# Patient Record
Sex: FEMALE | Race: OTHER | NOT HISPANIC OR LATINO | Employment: FULL TIME | URBAN - METROPOLITAN AREA
[De-identification: names, ages, dates, MRNs, and addresses within clinical notes are randomized per-mention and may not be internally consistent; named-entity substitution may affect disease eponyms.]

---

## 2022-03-28 ENCOUNTER — HOSPITAL ENCOUNTER (EMERGENCY)
Facility: HOSPITAL | Age: 47
Discharge: HOME | End: 2022-03-28
Attending: EMERGENCY MEDICINE | Admitting: EMERGENCY MEDICINE
Payer: COMMERCIAL

## 2022-03-28 ENCOUNTER — APPOINTMENT (EMERGENCY)
Dept: RADIOLOGY | Facility: HOSPITAL | Age: 47
End: 2022-03-28
Payer: COMMERCIAL

## 2022-03-28 VITALS
HEIGHT: 62 IN | HEART RATE: 77 BPM | DIASTOLIC BLOOD PRESSURE: 68 MMHG | RESPIRATION RATE: 16 BRPM | OXYGEN SATURATION: 98 % | WEIGHT: 120 LBS | TEMPERATURE: 98 F | SYSTOLIC BLOOD PRESSURE: 142 MMHG | BODY MASS INDEX: 22.08 KG/M2

## 2022-03-28 DIAGNOSIS — R07.9 CHEST PAIN: ICD-10-CM

## 2022-03-28 DIAGNOSIS — I47.1 SVT (SUPRAVENTRICULAR TACHYCARDIA) (HCC): Primary | ICD-10-CM

## 2022-03-28 LAB
2HR DELTA HS TROPONIN: 268 NG/L
ALBUMIN SERPL BCP-MCNC: 3.8 G/DL (ref 3.5–5)
ALP SERPL-CCNC: 56 U/L (ref 46–116)
ALT SERPL W P-5'-P-CCNC: 32 U/L (ref 12–78)
ANION GAP SERPL CALCULATED.3IONS-SCNC: 9 MMOL/L (ref 4–13)
APTT PPP: 29 SECONDS (ref 23–37)
AST SERPL W P-5'-P-CCNC: 29 U/L (ref 5–45)
ATRIAL RATE: 108 BPM
BACTERIA UR QL AUTO: NORMAL /HPF
BASOPHILS # BLD AUTO: 0.02 THOUSANDS/ΜL (ref 0–0.1)
BASOPHILS NFR BLD AUTO: 0 % (ref 0–1)
BILIRUB SERPL-MCNC: 0.53 MG/DL (ref 0.2–1)
BILIRUB UR QL STRIP: NEGATIVE
BUN SERPL-MCNC: 14 MG/DL (ref 5–25)
CALCIUM SERPL-MCNC: 8.1 MG/DL (ref 8.3–10.1)
CARDIAC TROPONIN I PNL SERPL HS: 335 NG/L
CARDIAC TROPONIN I PNL SERPL HS: 67 NG/L
CHLORIDE SERPL-SCNC: 103 MMOL/L (ref 100–108)
CLARITY UR: CLEAR
CO2 SERPL-SCNC: 25 MMOL/L (ref 21–32)
COLOR UR: ABNORMAL
CREAT SERPL-MCNC: 0.81 MG/DL (ref 0.6–1.3)
D DIMER PPP FEU-MCNC: 0.3 UG/ML FEU
EOSINOPHIL # BLD AUTO: 0.06 THOUSAND/ΜL (ref 0–0.61)
EOSINOPHIL NFR BLD AUTO: 1 % (ref 0–6)
ERYTHROCYTE [DISTWIDTH] IN BLOOD BY AUTOMATED COUNT: 12.7 % (ref 11.6–15.1)
GFR SERPL CREATININE-BSD FRML MDRD: 86 ML/MIN/1.73SQ M
GLUCOSE SERPL-MCNC: 154 MG/DL (ref 65–140)
GLUCOSE UR STRIP-MCNC: NEGATIVE MG/DL
HCT VFR BLD AUTO: 43.2 % (ref 34.8–46.1)
HGB BLD-MCNC: 14.2 G/DL (ref 11.5–15.4)
HGB UR QL STRIP.AUTO: ABNORMAL
IMM GRANULOCYTES # BLD AUTO: 0.03 THOUSAND/UL (ref 0–0.2)
IMM GRANULOCYTES NFR BLD AUTO: 0 % (ref 0–2)
INR PPP: 1.04 (ref 0.84–1.19)
KETONES UR STRIP-MCNC: NEGATIVE MG/DL
LEUKOCYTE ESTERASE UR QL STRIP: NEGATIVE
LYMPHOCYTES # BLD AUTO: 0.93 THOUSANDS/ΜL (ref 0.6–4.47)
LYMPHOCYTES NFR BLD AUTO: 10 % (ref 14–44)
MAGNESIUM SERPL-MCNC: 1.8 MG/DL (ref 1.6–2.6)
MCH RBC QN AUTO: 30.5 PG (ref 26.8–34.3)
MCHC RBC AUTO-ENTMCNC: 32.9 G/DL (ref 31.4–37.4)
MCV RBC AUTO: 93 FL (ref 82–98)
MONOCYTES # BLD AUTO: 0.44 THOUSAND/ΜL (ref 0.17–1.22)
MONOCYTES NFR BLD AUTO: 5 % (ref 4–12)
NEUTROPHILS # BLD AUTO: 7.81 THOUSANDS/ΜL (ref 1.85–7.62)
NEUTS SEG NFR BLD AUTO: 84 % (ref 43–75)
NITRITE UR QL STRIP: NEGATIVE
NON-SQ EPI CELLS URNS QL MICRO: NORMAL /HPF
NRBC BLD AUTO-RTO: 0 /100 WBCS
P AXIS: 149 DEGREES
PH UR STRIP.AUTO: 5.5 [PH]
PLATELET # BLD AUTO: 182 THOUSANDS/UL (ref 149–390)
PMV BLD AUTO: 10.4 FL (ref 8.9–12.7)
POTASSIUM SERPL-SCNC: 3.8 MMOL/L (ref 3.5–5.3)
PR INTERVAL: 142 MS
PROT SERPL-MCNC: 7.3 G/DL (ref 6.4–8.2)
PROT UR STRIP-MCNC: NEGATIVE MG/DL
PROTHROMBIN TIME: 13.4 SECONDS (ref 11.6–14.5)
QRS AXIS: 129 DEGREES
QRS AXIS: 60 DEGREES
QRSD INTERVAL: 76 MS
QRSD INTERVAL: 94 MS
QT INTERVAL: 268 MS
QT INTERVAL: 300 MS
QTC INTERVAL: 402 MS
QTC INTERVAL: 426 MS
RBC # BLD AUTO: 4.66 MILLION/UL (ref 3.81–5.12)
RBC #/AREA URNS AUTO: NORMAL /HPF
SODIUM SERPL-SCNC: 137 MMOL/L (ref 136–145)
SP GR UR STRIP.AUTO: 1.01 (ref 1–1.03)
T WAVE AXIS: 134 DEGREES
T WAVE AXIS: 43 DEGREES
TSH SERPL DL<=0.05 MIU/L-ACNC: 1.38 UIU/ML (ref 0.36–3.74)
UROBILINOGEN UR QL STRIP.AUTO: 0.2 E.U./DL
VENTRICULAR RATE: 108 BPM
VENTRICULAR RATE: 152 BPM
WBC # BLD AUTO: 9.29 THOUSAND/UL (ref 4.31–10.16)
WBC #/AREA URNS AUTO: NORMAL /HPF

## 2022-03-28 PROCEDURE — 93005 ELECTROCARDIOGRAM TRACING: CPT

## 2022-03-28 PROCEDURE — 99245 OFF/OP CONSLTJ NEW/EST HI 55: CPT | Performed by: INTERNAL MEDICINE

## 2022-03-28 PROCEDURE — 85730 THROMBOPLASTIN TIME PARTIAL: CPT | Performed by: EMERGENCY MEDICINE

## 2022-03-28 PROCEDURE — 36415 COLL VENOUS BLD VENIPUNCTURE: CPT | Performed by: EMERGENCY MEDICINE

## 2022-03-28 PROCEDURE — 99285 EMERGENCY DEPT VISIT HI MDM: CPT

## 2022-03-28 PROCEDURE — 85025 COMPLETE CBC W/AUTO DIFF WBC: CPT | Performed by: EMERGENCY MEDICINE

## 2022-03-28 PROCEDURE — 93010 ELECTROCARDIOGRAM REPORT: CPT | Performed by: INTERNAL MEDICINE

## 2022-03-28 PROCEDURE — 99291 CRITICAL CARE FIRST HOUR: CPT | Performed by: EMERGENCY MEDICINE

## 2022-03-28 PROCEDURE — 71045 X-RAY EXAM CHEST 1 VIEW: CPT

## 2022-03-28 PROCEDURE — 83735 ASSAY OF MAGNESIUM: CPT | Performed by: EMERGENCY MEDICINE

## 2022-03-28 PROCEDURE — 85379 FIBRIN DEGRADATION QUANT: CPT | Performed by: EMERGENCY MEDICINE

## 2022-03-28 PROCEDURE — 84484 ASSAY OF TROPONIN QUANT: CPT | Performed by: EMERGENCY MEDICINE

## 2022-03-28 PROCEDURE — 84443 ASSAY THYROID STIM HORMONE: CPT | Performed by: NURSE PRACTITIONER

## 2022-03-28 PROCEDURE — 96374 THER/PROPH/DIAG INJ IV PUSH: CPT

## 2022-03-28 PROCEDURE — 80053 COMPREHEN METABOLIC PANEL: CPT | Performed by: EMERGENCY MEDICINE

## 2022-03-28 PROCEDURE — 81001 URINALYSIS AUTO W/SCOPE: CPT | Performed by: EMERGENCY MEDICINE

## 2022-03-28 PROCEDURE — 85610 PROTHROMBIN TIME: CPT | Performed by: EMERGENCY MEDICINE

## 2022-03-28 RX ORDER — ADENOSINE 3 MG/ML
6 INJECTION INTRAVENOUS ONCE
Status: COMPLETED | OUTPATIENT
Start: 2022-03-28 | End: 2022-03-28

## 2022-03-28 RX ADMIN — ADENOSINE 6 MG: 3 INJECTION, SOLUTION INTRAVENOUS at 12:09

## 2022-03-28 RX ADMIN — SODIUM CHLORIDE 1000 ML: 0.9 INJECTION, SOLUTION INTRAVENOUS at 12:09

## 2022-03-28 NOTE — Clinical Note
Case was discussed with Dr Maria D Coffey and the patient's admission status was agreed to be Admission Status: observation status to the service of Dr Maria D Coffey

## 2022-03-28 NOTE — CONSULTS
Consultation - Cardiology   Deborah Mills 51 y o  female MRN: [id number]  Unit/Bed#: ED 07 Encounter: [id number]    Assessment/Plan     Assessment:  1  Rapid heart rate for AVNT  2  Abnormal troponins most likely non MI troponin elevation secondary to tachycardia  3  Low blood pressure      Plan:  Patient seen in the emergency room, long discussion with both patient and her regarding the pathophysiology and etiology of her rhythm  Both verbalized understanding   Discussed pill in pocket therapy with metoprolol 12 5 mg as needed for rapid heart rate as due to low blood pressure patient is not a candidate for chronic therapy   Discussed follow-up in the office next week for further evaluation and workup and also discussed that should rhythm continue to reoccur and become troublesome she would need to follow-up with electrophysiology to discuss other therapies including ablation  Outpatient new exercise nuclear stress test orders placed in AVS   Unfortunately I was unable to place outpatient echo orders  Patient has been verbalize understanding that should rhythm reoccur or she have any difficulty with chest pain, shortness of breath, dizziness or lightheadedness she is to return to the emergency room  History of Present Illness   Physician Requesting Consult: No att  providers found  Reason for Consult / Principal Problem:  Rapid arrhythmia, abnormal high sensitivity troponins        HPI: Deborah Mills is a 51y o  year old female who presented to the emergency room after she experienced rapid heart rate/palpitations and chest pressure for an extended period of time while she was at work  Patient works at the [employer] and is very active in her work  She states while at work she suddenly began to feel her heart racing  She states this can occur once or twice a year and lasts very briefly, so initially she was not alerted to with    When it persisted and she began to have chest pressure she became alarmed and called her   She was brought to the emergency room for evaluation  In the emergency room she was found to be in a rapid heart rate which appears to be Avnt and proximally 160 beats per minute  Vagal maneuvers were attempted and unsuccessful and then patient was given adenosine 6 mg IV which did slow her heart rate down  She has been sinus rhythm since that time  Initial high sensitivity troponin was 67 and 2nd was 337  Twelve lead EKG did not demonstrate any acute ST or T-wave changes while she was rapid nor postprocedure  Repeat EKGs done after adenosine do demonstrate limb lead reversal     Patient is extremely active, her  is in room and on interview he states she never has shortness of breath and she runs around the house Ne janaym a humming bird  Consult to cardiology  Consult performed by: BRADFORD Blake  Consult ordered by: Hay Veloz MD          Review of Systems   Constitutional: Negative  Negative for activity change, appetite change, fatigue and fever  HENT: Negative  Negative for congestion, facial swelling, sinus pressure and tinnitus  Eyes: Negative for photophobia and visual disturbance  Respiratory: Positive for chest tightness  Cardiovascular: Positive for palpitations  Negative for chest pain  Gastrointestinal: Negative for abdominal distention, diarrhea, nausea and vomiting  Endocrine: Negative  Negative for polydipsia, polyphagia and polyuria  Genitourinary: Negative  Negative for difficulty urinating  Musculoskeletal: Negative  Negative for arthralgias  Neurological: Negative  Negative for dizziness, speech difficulty, weakness and headaches  Hematological: Negative  Psychiatric/Behavioral: Negative          Historical Information   Past Medical History:   Diagnosis Date    Ovarian cancer ([facility] [address] )     Parasite infestation     in liver     Past Surgical History:   Procedure Laterality Date    HYSTERECTOMY Social History     Substance and Sexual Activity   Alcohol Use Never     Social History     Substance and Sexual Activity   Drug Use Never     E-Cigarette/Vaping     E-Cigarette/Vaping Substances     Social History     Tobacco Use   Smoking Status Never Smoker   Smokeless Tobacco Never Used     Family History: History reviewed  No pertinent family history  Meds/Allergies   all current active meds have been reviewed, current meds:   No current facility-administered medications for this encounter  and PTA meds:   None     No Known Allergies    Objective   Vitals: Blood pressure 90/55, pulse 76, temperature 98 2 °F (36 8 °C), temperature source Probe, resp  rate 15, height 5' 2" (1 575 m), weight 54 4 kg (120 lb), SpO2 100 %  Orthostatic Blood Pressures      Most Recent Value   Blood Pressure 90/55 filed at 03/28/2022 1503   Patient Position - Orthostatic VS Sitting filed at 03/28/2022 1205          No intake or output data in the 24 hours ending 03/28/22 1604    Invasive Devices  Report    Peripheral Intravenous Line            Peripheral IV 03/28/22 Left Antecubital <1 day                Physical Exam  Vitals and nursing note reviewed  Constitutional:       General: She is not in acute distress  Appearance: Normal appearance  She is normal weight  HENT:      Head: Normocephalic  Right Ear: External ear normal       Left Ear: External ear normal       Nose: Nose normal    Eyes:      General: No scleral icterus  Right eye: No discharge  Left eye: No discharge  Cardiovascular:      Rate and Rhythm: Normal rate and regular rhythm  Pulses: Normal pulses  Pulmonary:      Effort: Pulmonary effort is normal  No respiratory distress  Breath sounds: Normal breath sounds  No wheezing, rhonchi or rales  Abdominal:      General: Bowel sounds are normal  There is no distension  Palpations: Abdomen is soft  Musculoskeletal:      Right lower leg: No edema        Left lower leg: No edema  Skin:     General: Skin is warm and dry  Capillary Refill: Capillary refill takes less than 2 seconds  Neurological:      General: No focal deficit present  Mental Status: She is alert and oriented to person, place, and time  Mental status is at baseline  Psychiatric:         Mood and Affect: Mood normal          Lab Results:   I have personally reviewed pertinent lab results  CBC with diff:   Results from last 7 days   Lab Units 03/28/22  1218   WBC Thousand/uL 9 29   RBC Million/uL 4 66   HEMOGLOBIN g/dL 14 2   HEMATOCRIT % 43 2   MCV fL 93   MCH pg 30 5   MCHC g/dL 32 9   RDW % 12 7   MPV fL 10 4   PLATELETS Thousands/uL 182     CMP:   Results from last 7 days   Lab Units 03/28/22  1218   SODIUM mmol/L 137   CHLORIDE mmol/L 103   CO2 mmol/L 25   BUN mg/dL 14   CREATININE mg/dL 0 81   CALCIUM mg/dL 8 1*   AST U/L 29   ALT U/L 32   ALK PHOS U/L 56   EGFR ml/min/1 73sq m 86     HS Troponin:   0   Lab Value Date/Time    HSTNI0 67 (H) 03/28/2022 1218    HSTNI2 335 (H) 03/28/2022 1428     BNP:   Results from last 7 days   Lab Units 03/28/22  1218   POTASSIUM mmol/L 3 8   CHLORIDE mmol/L 103   CO2 mmol/L 25   BUN mg/dL 14   CREATININE mg/dL 0 81   CALCIUM mg/dL 8 1*   EGFR ml/min/1 73sq m 86     Coags:   Results from last 7 days   Lab Units 03/28/22  1218   PTT seconds 29   INR  1 04     TSH:   pending  Magnesium:   Results from last 7 days   Lab Units 03/28/22  1218   MAGNESIUM mg/dL 1 8     Lipid Profile:     Imaging: I have personally reviewed pertinent reports  EKG:  Presenting EKG appeared to be AVNRT at a rate of 155, patient received adenosine and heart rate slowed and now is sinus rhythm in the 80s    VTE Prophylaxis: Sequential compression device Vanice Miss)     Code Status: No Order  Advance Directive and Living Will:      Power of :    POLST:      Zully Savage, [address] [place]   Cardiology

## 2022-03-28 NOTE — ED PROCEDURE NOTE
PROCEDURE  ECG 12 Lead Documentation Only    Date/Time: 3/28/2022 12:03 PM  Performed by: Vickie Patel DO  Authorized by: Vickie Patel DO     ECG reviewed by me, the ED Provider: yes    Patient location:  ED  Interpretation:     Interpretation: abnormal    Rate:     ECG rate:  108    ECG rate assessment: tachycardic    Rhythm:     Rhythm: sinus rhythm    Ectopy:     Ectopy: none    ST segments:     ST segments:  Normal         Vickie Patel DO  03/28/22 1204

## 2022-03-28 NOTE — ED PROVIDER NOTES
History  Chief Complaint   Patient presents with    Rapid Heart Rate     Pt c/o non radiating 8/10 chest pressure  denies SOB and palpatations  HR of 156 observed  EKG perfomed and MD at bedside  Patient presents for evaluation of chest pain radiated out of 10 described as a pressure  Patient denies any shortness of breath  Patient does feel like her heart rate is going fast   Symptoms started about 30 minutes ago  Patient noted being SVT on arrival   Patient denies any history of arrhythmias  No apparent modifying factors for her symptoms  History provided by:  Patient   used: No    Rapid Heart Rate  Associated symptoms: chest pain    Associated symptoms: no back pain, no cough, no shortness of breath and no vomiting        None       Past Medical History:   Diagnosis Date    Ovarian cancer ([facility] [address] )     Parasite infestation     in liver       Past Surgical History:   Procedure Laterality Date    HYSTERECTOMY         History reviewed  No pertinent family history  I have reviewed and agree with the history as documented  E-Cigarette/Vaping     E-Cigarette/Vaping Substances     Social History     Tobacco Use    Smoking status: Never Smoker    Smokeless tobacco: Never Used   Substance Use Topics    Alcohol use: Never    Drug use: Never       Review of Systems   Constitutional: Negative for chills and fever  HENT: Negative for ear pain and sore throat  Eyes: Negative for pain and visual disturbance  Respiratory: Negative for cough and shortness of breath  Cardiovascular: Positive for chest pain and palpitations  Gastrointestinal: Negative for abdominal pain and vomiting  Genitourinary: Negative for dysuria and hematuria  Musculoskeletal: Negative for arthralgias and back pain  Skin: Negative for color change and rash  Neurological: Negative for seizures and syncope  All other systems reviewed and are negative        Physical Exam  Physical Exam  Vitals and nursing note reviewed  Constitutional:       General: She is not in acute distress  HENT:      Head: Atraumatic  Right Ear: External ear normal       Left Ear: External ear normal       Nose: Nose normal       Mouth/Throat:      Mouth: Mucous membranes are moist       Pharynx: Oropharynx is clear  Eyes:      General: No scleral icterus  Conjunctiva/sclera: Conjunctivae normal    Cardiovascular:      Rate and Rhythm: Regular rhythm  Tachycardia present  Pulses: Normal pulses  Pulmonary:      Effort: Pulmonary effort is normal  No respiratory distress  Breath sounds: Normal breath sounds  Abdominal:      General: Abdomen is flat  Bowel sounds are normal  There is no distension  Palpations: Abdomen is soft  Tenderness: There is no abdominal tenderness  There is no guarding or rebound  Musculoskeletal:         General: No deformity  Normal range of motion  Skin:     Capillary Refill: Capillary refill takes less than 2 seconds  Findings: No rash  Neurological:      General: No focal deficit present  Mental Status: She is alert and oriented to person, place, and time           Vital Signs  ED Triage Vitals [03/28/22 1205]   Temperature Pulse Respirations Blood Pressure SpO2   98 2 °F (36 8 °C) 99 16 117/70 99 %      Temp Source Heart Rate Source Patient Position - Orthostatic VS BP Location FiO2 (%)   Probe Monitor Sitting Right arm --      Pain Score       8           Vitals:    03/28/22 1205 03/28/22 1254 03/28/22 1503   BP: 117/70 115/67 90/55   Pulse: 99 92 76   Patient Position - Orthostatic VS: Sitting           Visual Acuity      ED Medications  Medications   sodium chloride 0 9 % bolus 1,000 mL (1,000 mL Intravenous New Bag 3/28/22 1209)   adenosine (ADENOCARD) injection 6 mg (6 mg Intravenous Given 3/28/22 1209)       Diagnostic Studies  Results Reviewed     Procedure Component Value Units Date/Time    TSH, 3rd generation with Free T4 reflex [[id number]] Collected: 03/28/22 1218    Lab Status:  In process Specimen: Blood from Arm, Left Updated: 03/28/22 1616    HS Troponin I 2hr [[id number]]  (Abnormal) Collected: 03/28/22 1428    Lab Status: Final result Specimen: Blood from Arm, Left Updated: 03/28/22 1500     hs TnI 2hr 335 ng/L      Delta 2hr hsTnI 268 ng/L     HS Troponin I 4hr [[id number]]     Lab Status: No result Specimen: Blood     HS Troponin 0hr (reflex protocol) [[id number]]  (Abnormal) Collected: 03/28/22 1218    Lab Status: Final result Specimen: Blood from Arm, Left Updated: 03/28/22 1255     hs TnI 0hr 67 ng/L     Urine Microscopic [[id number]]  (Normal) Collected: 03/28/22 1218    Lab Status: Final result Specimen: Urine, Clean Catch Updated: 03/28/22 1253     RBC, UA None Seen /hpf      WBC, UA 0-1 /hpf      Epithelial Cells Occasional /hpf      Bacteria, UA Occasional /hpf     Comprehensive metabolic panel [[id number]]  (Abnormal) Collected: 03/28/22 1218    Lab Status: Final result Specimen: Blood from Arm, Left Updated: 03/28/22 1247     Sodium 137 mmol/L      Potassium 3 8 mmol/L      Chloride 103 mmol/L      CO2 25 mmol/L      ANION GAP 9 mmol/L      BUN 14 mg/dL      Creatinine 0 81 mg/dL      Glucose 154 mg/dL      Calcium 8 1 mg/dL      AST 29 U/L      ALT 32 U/L      Alkaline Phosphatase 56 U/L      Total Protein 7 3 g/dL      Albumin 3 8 g/dL      Total Bilirubin 0 53 mg/dL      eGFR 86 ml/min/1 73sq m     Narrative:      [facility] guidelines for Chronic Kid[facility] Disease (CKD):     Stage 1 with normal or high GFR (GFR > 90 mL/min/1 73 square meters)    Stage 2 Mild CKD (GFR = 60-89 mL/min/1 73 square meters)    Stage 3A Moderate CKD (GFR = 45-59 mL/min/1 73 square meters)    Stage 3B Moderate CKD (GFR = 30-44 mL/min/1 73 square meters)    Stage 4 Severe CKD (GFR = 15-29 mL/min/1 73 square meters)    Stage 5 End Stage CKD (GFR <15 mL/min/1 73 square meters)  Note: GFR calculation is accurate only with a steady state creatinine    Magnesium [[id number]]  (Normal) Collected: 03/28/22 1218    Lab Status: Final result Specimen: Blood from Arm, Left Updated: 03/28/22 1247     Magnesium 1 8 mg/dL     D-Dimer [[id number]]  (Normal) Collected: 03/28/22 1218    Lab Status: Final result Specimen: Blood from Arm, Left Updated: 03/28/22 1245     D-Dimer, Quant 0 30 ug/ml FEU     Protime-INR [[id number]]  (Normal) Collected: 03/28/22 1218    Lab Status: Final result Specimen: Blood from Arm, Left Updated: 03/28/22 1243     Protime 13 4 seconds      INR 1 04    APTT [[id number]]  (Normal) Collected: 03/28/22 1218    Lab Status: Final result Specimen: Blood from Arm, Left Updated: 03/28/22 1243     PTT 29 seconds     UA (URINE) with reflex to Scope [[id number]]  (Abnormal) Collected: 03/28/22 1218    Lab Status: Final result Specimen: Urine, Clean Catch Updated: 03/28/22 1226     Color, UA Light Yellow     Clarity, UA Clear     Specific Gravity, UA 1 010     pH, UA 5 5     Leukocytes, UA Negative     Nitrite, UA Negative     Protein, UA Negative mg/dl      Glucose, UA Negative mg/dl      Ketones, UA Negative mg/dl      Urobilinogen, UA 0 2 E U /dl      Bilirubin, UA Negative     Blood, UA Trace-Intact    CBC and differential [[id number]]  (Abnormal) Collected: 03/28/22 1218    Lab Status: Final result Specimen: Blood from Arm, Left Updated: 03/28/22 1226     WBC 9 29 Thousand/uL      RBC 4 66 Million/uL      Hemoglobin 14 2 g/dL      Hematocrit 43 2 %      MCV 93 fL      MCH 30 5 pg      MCHC 32 9 g/dL      RDW 12 7 %      MPV 10 4 fL      Platelets 623 Thousands/uL      nRBC 0 /100 WBCs      Neutrophils Relative 84 %      Immat GRANS % 0 %      Lymphocytes Relative 10 %      Monocytes Relative 5 %      Eosinophils Relative 1 %      Basophils Relative 0 %      Neutrophils Absolute 7 81 Thousands/µL      Immature Grans Absolute 0 03 Thousand/uL      Lymphocytes Absolute 0 93 Thousands/µL      Monocytes Absolute 0 44 Thousand/µL      Eosinophils Absolute 0 06 Thousand/µL      Basophils Absolute 0 02 Thousands/µL                  XR chest 1 view portable   Final Result by Fransico Johsnon MD (03/28 6683)      No acute cardiopulmonary disease  Workstation performed: [id number]                    Procedures  CriticalCare Time  Performed by: Brooks Carr DO  Authorized by: Brooks Carr DO     Critical care provider statement:     Critical care time (minutes):  35    Critical care time was exclusive of:  Separately billable procedures and treating other patients    Critical care was necessary to treat or prevent imminent or life-threatening deterioration of the following conditions: Supraventricular tachycardia  Critical care was time spent personally by me on the following activities:  Development of treatment plan with patient or surrogate, obtaining history from patient or surrogate, blood draw for specimens, discussions with consultants, evaluation of patient's response to treatment, examination of patient, review of old charts, re-evaluation of patient's condition, ordering and review of radiographic studies, ordering and review of laboratory studies, ordering and performing treatments and interventions and interpretation of cardiac output measurements             ED Course             HEART Risk Score      Most Recent Value   Heart Score Risk Calculator    History 1 Filed at: 03/28/2022 1548   ECG 1 Filed at: 03/28/2022 1548   Age 1 Filed at: 03/28/2022 1548   Risk Factors 1 Filed at: 03/28/2022 1548   Troponin 2 Filed at: 03/28/2022 1548   HEART Score 6 Filed at: 03/28/2022 1548                                      MDM  Number of Diagnoses or Management Options  Chest pain  SVT (supraventricular tachycardia) ([facility] [address] )  Diagnosis management comments: Pulse ox 100% on room air indicating adequate oxygenation    CXR: NAD as read by me    Patient seen immediately on arrival   Patient was given 6 mg of adenosine which converted to sinus rhythm  Lab work revealed revealed elevated troponin  Delta troponin was obtained which was also elevated  Patient's chest pain had resolved after the episode SVT at then aborted  Dr Chuck Mario cardiologist at bedside to evaluate the patient  Feels the patient can be discharged for outpatient follow-up  Patient return if symptoms worsen    Recommended 12 5 mg metoprolol b i d        Amount and/or Complexity of Data Reviewed  Clinical lab tests: ordered and reviewed  Tests in the radiology section of CPT®: ordered and reviewed  Decide to obtain previous medical records or to obtain history from someone other than the patient: yes  Review and summarize past medical records: yes  Discuss the patient with other providers: yes  Independent visualization of images, tracings, or specimens: yes    Patient Progress  Patient progress: improved      Disposition  Final diagnoses:   SVT (supraventricular tachycardia) ([facility] [address] )   Chest pain     Time reflects when diagnosis was documented in both MDM as applicable and the Disposition within this note     Time User Action Codes Description Comment    3/28/2022  3:11 PM Pauline Gann Ettatawer [I47 1] SVT (supraventricular tachycardia) ([facility] [address] )     3/28/2022  3:11 PM Aarti Hager [R07 9] Chest pain       ED Disposition     ED Disposition Condition Date/Time Comment    Discharge Stable Mon Mar 28, 2022  3:59 PM       Follow-up Information     Follow up With Specialties Details Why Contact Info    Hernan Gonsales MD Cardiology In 1 week  [address] [place] [address]  [phone]            Patient's Medications   Discharge Prescriptions    METOPROLOL TARTRATE (LOPRESSOR) 25 MG TABLET    Take 0 5 tablets (12 5 mg total) by mouth 2 (two) times a day       Start Date: 3/28/2022 End Date: 4/27/2022       Order Dose: 12 5 mg       Quantity: 30 tablet    Refills: 0       Outpatient Discharge Orders   NM Myocardial Perfusion Spect (Exercise Induced Stress and/or Rest)   Standing Status: Future Standing Exp   Date: 03/28/23       PDMP Review     None          ED Provider  Electronically Signed by           Nathanial Cushing, DO  03/28/22 Lucho Rodrigez, DO  03/28/22 0673

## 2022-03-28 NOTE — ED PROCEDURE NOTE
PROCEDURE  ECG 12 Lead Documentation Only    Date/Time: 3/28/2022 12:03 PM  Performed by: Ginette Fernandes DO  Authorized by: Ginette Fernandes DO     ECG reviewed by me, the ED Provider: yes    Patient location:  ED  Interpretation:     Interpretation: abnormal    Rate:     ECG rate:  152    ECG rate assessment: tachycardic    Rhythm:     Rhythm: SVT    Ectopy:     Ectopy: none    ST segments:     ST segments:  Non-specific         Ginette Fernandes DO  03/28/22 1203

## 2022-04-15 ENCOUNTER — HOSPITAL ENCOUNTER (OUTPATIENT)
Dept: NON INVASIVE DIAGNOSTICS | Facility: HOSPITAL | Age: 47
Discharge: HOME | End: 2022-04-15
Payer: COMMERCIAL

## 2022-04-15 VITALS
WEIGHT: 120 LBS | DIASTOLIC BLOOD PRESSURE: 76 MMHG | HEIGHT: 62 IN | HEART RATE: 78 BPM | SYSTOLIC BLOOD PRESSURE: 116 MMHG | BODY MASS INDEX: 22.08 KG/M2

## 2022-04-15 DIAGNOSIS — I47.1 SVT (SUPRAVENTRICULAR TACHYCARDIA) (HCC): ICD-10-CM

## 2022-04-15 DIAGNOSIS — R07.9 CHEST PAIN: ICD-10-CM

## 2022-04-15 LAB
APICAL FOUR CHAMBER EJECTION FRACTION: 41 %
AV LVOT PEAK GRADIENT: 3 MMHG
AV PEAK GRADIENT: 6 MMHG
DOP CALC LVOT AREA: 3.14 CM2
DOP CALC LVOT DIAMETER: 2 CM
E WAVE DECELERATION TIME: 141 MS
LEFT ATRIUM AREA SYSTOLE SINGLE PLANE A4C: 14.1 CM2
MV E'TISSUE VEL-LAT: 14 CM/S
MV E'TISSUE VEL-SEP: 17 CM/S
MV PEAK A VEL: 0.66 M/S
MV PEAK E VEL: 93 CM/S
MV STENOSIS PRESSURE HALF TIME: 41 MS
MV VALVE AREA P 1/2 METHOD: 5.37 CM2
PV PEAK GRADIENT: 3 MMHG
RIGHT ATRIUM AREA SYSTOLE A4C: 11.7 CM2
RIGHT VENTRICLE ID DIMENSION: 3.1 CM
SL CV LV EF: 55
TR MAX PG: 19 MMHG
TR PEAK VELOCITY: 2.2 M/S
TRICUSPID VALVE PEAK REGURGITATION VELOCITY: 2.15 M/S

## 2022-04-15 PROCEDURE — 93306 TTE W/DOPPLER COMPLETE: CPT

## 2022-04-15 PROCEDURE — 93306 TTE W/DOPPLER COMPLETE: CPT | Performed by: INTERNAL MEDICINE

## 2022-04-18 ENCOUNTER — HOSPITAL ENCOUNTER (OUTPATIENT)
Dept: RADIOLOGY | Facility: HOSPITAL | Age: 47
Discharge: HOME | End: 2022-04-18
Payer: COMMERCIAL

## 2022-04-18 ENCOUNTER — HOSPITAL ENCOUNTER (OUTPATIENT)
Dept: NON INVASIVE DIAGNOSTICS | Facility: HOSPITAL | Age: 47
Discharge: HOME | End: 2022-04-18
Payer: COMMERCIAL

## 2022-04-18 DIAGNOSIS — I47.1 SVT (SUPRAVENTRICULAR TACHYCARDIA) (HCC): ICD-10-CM

## 2022-04-18 LAB
CHEST PAIN STATEMENT: NORMAL
MAX DIASTOLIC BP: 80 MMHG
MAX HEART RATE: 166 BPM
MAX PREDICTED HEART RATE: 173 BPM
MAX. SYSTOLIC BP: 164 MMHG
PROTOCOL NAME: NORMAL
TARGET HR FORMULA: NORMAL
TEST INDICATION: NORMAL
TIME IN EXERCISE PHASE: NORMAL

## 2022-04-18 PROCEDURE — A9502 TC99M TETROFOSMIN: HCPCS

## 2022-04-18 PROCEDURE — 78452 HT MUSCLE IMAGE SPECT MULT: CPT

## 2022-04-18 PROCEDURE — G1004 CDSM NDSC: HCPCS

## 2022-04-18 PROCEDURE — 93017 CV STRESS TEST TRACING ONLY: CPT

## 2022-04-19 LAB
MAX HR PERCENT: 95 %
MAX HR: 173 BPM
NUC STRESS EJECTION FRACTION: 73 %
RATE PRESSURE PRODUCT: NORMAL
SL CV REST NUCLEAR ISOTOPE DOSE: 10.7 MCI
SL CV STRESS NUCLEAR ISOTOPE DOSE: 31 MCI
SL CV STRESS RECOVERY BP: NORMAL MMHG
SL CV STRESS RECOVERY HR: 94 BPM
SL CV STRESS RECOVERY O2 SAT: 99 %
SL CV STRESS STAGE REACHED: 4
STRESS ANGINA INDEX: 0
STRESS BASELINE BP: NORMAL MMHG
STRESS BASELINE HR: 78 BPM
STRESS DUKE TREADMILL SCORE: 10
STRESS O2 SAT REST: 100 %
STRESS PEAK HR: 166 BPM
STRESS PERCENT HR: 95 %
STRESS POST ESTIMATED WORKLOAD: 12 METS
STRESS POST EXERCISE DUR MIN: 9 MIN
STRESS POST EXERCISE DUR SEC: 36 SEC
STRESS POST O2 SAT PEAK: 98 %
STRESS POST PEAK BP: 164 MMHG
STRESS ST DEPRESSION: 0 MM
STRESS TARGET HR: 166 BPM
STRESS/REST PERFUSION RATIO: 1.1

## 2022-04-19 PROCEDURE — 93016 CV STRESS TEST SUPVJ ONLY: CPT | Performed by: INTERNAL MEDICINE

## 2022-04-19 PROCEDURE — 93018 CV STRESS TEST I&R ONLY: CPT | Performed by: INTERNAL MEDICINE

## 2022-04-19 PROCEDURE — 78452 HT MUSCLE IMAGE SPECT MULT: CPT | Performed by: INTERNAL MEDICINE

## 2022-06-28 ENCOUNTER — CONSULT (OUTPATIENT)
Dept: CARDIOLOGY CLINIC | Facility: CLINIC | Age: 47
End: 2022-06-28
Payer: COMMERCIAL

## 2022-06-28 VITALS
WEIGHT: 120 LBS | HEIGHT: 62 IN | OXYGEN SATURATION: 100 % | BODY MASS INDEX: 22.08 KG/M2 | SYSTOLIC BLOOD PRESSURE: 100 MMHG | TEMPERATURE: 97 F | HEART RATE: 68 BPM | DIASTOLIC BLOOD PRESSURE: 70 MMHG

## 2022-06-28 DIAGNOSIS — R00.2 PALPITATIONS: ICD-10-CM

## 2022-06-28 DIAGNOSIS — R07.9 CHEST PAIN, UNSPECIFIED TYPE: ICD-10-CM

## 2022-06-28 DIAGNOSIS — Z82.49 FAMILY HISTORY OF HEART DISEASE: ICD-10-CM

## 2022-06-28 DIAGNOSIS — I47.1 SVT (SUPRAVENTRICULAR TACHYCARDIA) (HCC): ICD-10-CM

## 2022-06-28 PROCEDURE — 93000 ELECTROCARDIOGRAM COMPLETE: CPT | Performed by: INTERNAL MEDICINE

## 2022-06-28 PROCEDURE — 99214 OFFICE O/P EST MOD 30 MIN: CPT | Performed by: INTERNAL MEDICINE

## 2022-06-28 RX ORDER — ASCORBIC ACID 500 MG
500 TABLET ORAL DAILY
COMMUNITY

## 2022-06-28 RX ORDER — ESTRADIOL 0.05 MG/D
PATCH TRANSDERMAL
COMMUNITY
Start: 2022-05-23

## 2022-06-28 NOTE — PROGRESS NOTES
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  EKG seems to be consistent with that  Advised her to take beta-blockers at at least in the evening and Lifestyle modification strongly recommended  We did discuss the option of AVNRT ablation versus medical therapy  For now she would try to use pill in the pocket strategy  She does run low blood pressure long-term medical therapy may not be good choice  Advised her to keep herself hydrated  2  Palpitations are much better now  3  Family history of heart disease  She had a family history of heart disease  She has no recent blood test for cholesterol will check fasting lipids based on that she may need statin therapy  We will also refer her to the medical doctors  4  Chest pain  She has no more episode of chest pain she has as pain when she was in SVT nuclear stress test shows no ischemia echo shows normal LV systolic function  All those issues discussed with her  Continue beta-blockers as needed  Follow-up 6-9 months  Blood test ordered      Patient  was advised and educated to call our office  immediately if  patient has any new symptoms of chest pain/shortness of breath, near-syncope, syncope, light headedness sustained palpitations  or any other cardiovascular symptoms before their scheduled follow-up appointment  Office #[phone]  Please call [phone] if any questions  Counseling :  A description of the counseling  Goals and Barriers    Patient's ability to self care: Yes  Medication side effect reviewed with patient in detail and all their questions answered to their satisfaction  HPI :     Annette Hay is a 51y o  year old female who came for follow up  Patient has who was initially seen by us in March 2022 for SVT and EKG appears to be consistent with AVNRT  She was in the emergency room with heart rate 152 beats per minute  She had minimally elevated troponin  Later on she want echo and stress test which shows she has normal LV systolic function and no significant valvular disease nuclear shows no ischemia  Today her EKG shows heart rate 68 beats per minute  She runs on low blood pressure  She never took the metoprolol she still occasionally gets palpitations  We discussed with her her episode of SVT and likely to have an accessory pathway  Advised to use p r n  Beta-blocker if he cannot take every day  She does have a family history of heart problem her sister have some heart blockages mother also had heart problems  She does not smoke she does not drink  She has not had any more sustained atrial arrhythmias as in the past   She is not keen for ablation jet she if he has recurrent episode she will consider it    Review of Systems   Constitutional: Negative for activity change, chills, diaphoresis, fever and unexpected weight change  HENT: Negative for congestion  Eyes: Negative for discharge and redness  Respiratory: Negative for cough, chest tightness, shortness of breath and wheezing  Cardiovascular: Positive for palpitations  Negative for chest pain and leg swelling  Gastrointestinal: Negative for abdominal pain, diarrhea and nausea  Endocrine: Negative  Genitourinary: Negative for decreased urine volume and urgency  Musculoskeletal: Negative  Negative for arthralgias, back pain and gait problem  Skin: Negative for rash and wound  Allergic/Immunologic: Negative      Neurological: Negative for dizziness, seizures, syncope, weakness, light-headedness and headaches  Hematological: Negative  Psychiatric/Behavioral: Negative for agitation and confusion  The patient is nervous/anxious  Historical Information   Past Medical History:   Diagnosis Date    Ovarian cancer ([address] )     Parasite infestation     in liver     Past Surgical History:   Procedure Laterality Date    HYSTERECTOMY       Social History     Substance and Sexual Activity   Alcohol Use Never     Social History     Substance and Sexual Activity   Drug Use Never     Social History     Tobacco Use   Smoking Status Never Smoker   Smokeless Tobacco Never Used     Family History: No family history on file  Meds/Allergies     No Known Allergies    Current Outpatient Medications:     ascorbic acid (VITAMIN C) 500 MG tablet, Take 500 mg by mouth daily, Disp: , Rfl:     estradiol (CLIMARA) 0 05 mg/24 hr, APPLY 1 PATCH ONE TIME PER WEEK, Disp: , Rfl:     Multiple Vitamins-Minerals (MULTIVITAMIN ADULTS PO), Take 1 capsule by mouth daily, Disp: , Rfl:     Zinc Acetate, Oral, (ZINC ACETATE PO), Take by mouth, Disp: , Rfl:     metoprolol tartrate (LOPRESSOR) 25 mg tablet, Take 0 5 tablets (12 5 mg total) by mouth 2 (two) times a day, Disp: 30 tablet, Rfl: 0    Vitals: Blood pressure 100/70, pulse 68, temperature (!) 97 °F (36 1 °C), height 5' 2" (1 575 m), weight 54 4 kg (120 lb), SpO2 100 %  ?  Body mass index is 21 95 kg/m²    Wt Readings from Last 3 Encounters:   06/28/22 54 4 kg (120 lb)   04/15/22 54 4 kg (120 lb)   03/28/22 54 4 kg (120 lb)     Vitals:    06/28/22 1523   Weight: 54 4 kg (120 lb)     BP Readings from Last 3 Encounters:   06/28/22 100/70   04/15/22 116/76   03/28/22 142/68       Physical Exam:  Neurologic:  Alert & oriented x 3, no new focal deficits, Not in any acute distress,  Constitutional:  Adequate built, non-toxic appearance   Neck: Normal range of motion, no tenderness,  Neck supple   Respiratory:  Bilateral air entry, mostly clear to auscultation  Cardiovascular: S1-S2 regular with no gallops or murmur  GI:  Soft, nondistended,  nontender, no hepatosplenomegaly appreciated  Musculoskeletal:  No edema, no tenderness, no deformities  Skin:  Well hydrated, no rash   Extremities:  No edema and distal pulses are present  Psychiatric:  Speech and behavior appropriate         Diagnostic Studies Review Cardio:    Echo Doppler  Echo Doppler done 04/15/2020 shows patient has EF 55%, trace MR, no other significant valvular disease  Nuclear stress test   Nuclear stress test done 04/18/2022 shows, no perfusion abnormality EF was 70%      EKG:    Twelve lead EKG 06/28/2022 shows normal sinus rhythm heart rate 68 beats per minute  No significant abnormality  Twelve lead EKG done on 03/28/2022 in the hospital shows SVT with heart rate 152 beats per minute  EKG to me appears to be consistent with AVNRT  Results for orders placed during the hospital encounter of 04/18/22    NM Myocardial Perfusion Spect (Exercise Induced Stress and/or Rest)    Interpretation Summary    Stress ECG: No ST deviation is noted  The ECG was negative for ischemia  The stress ECG is negative for ischemia after maximal exercise, without reproduction of symptoms    Stress Function: Left ventricular function post-stress is normal  Post-stress ejection fraction is 73 %    Perfusion: There are no perfusion defects    Negative study for inducible arrhythmia with peak stress or major reversible ischemia        Lab Review   Lab Results   Component Value Date    WBC 9 29 03/28/2022    HGB 14 2 03/28/2022    HCT 43 2 03/28/2022    MCV 93 03/28/2022    RDW 12 7 03/28/2022     03/28/2022     BMP:  Lab Results   Component Value Date    SODIUM 137 03/28/2022    K 3 8 03/28/2022     03/28/2022    CO2 25 03/28/2022    BUN 14 03/28/2022    CREATININE 0 81 03/28/2022    GLUC 154 (H) 03/28/2022    CALCIUM 8 1 (L) 03/28/2022    EGFR 86 03/28/2022    MG 1 8 03/28/2022 Troponins:    LFT:  Lab Results   Component Value Date    AST 29 03/28/2022    ALT 32 03/28/2022    ALKPHOS 56 03/28/2022    TP 7 3 03/28/2022    ALB 3 8 03/28/2022      No components found for: [employer] [facility]  Lab Results   Component Value Date    BQJ0WGTZCMEZ 1 384 03/28/2022     Lab Results   Component Value Date    HGBA1C 5 4 12/22/2018             Dr Mark Urena MD [facility] [place]      "This note has been constructed using a voice recognition system  Therefore there may be syntax, spelling, and/or grammatical errors   Please call if you have any questions  "